# Patient Record
Sex: MALE | Race: WHITE | NOT HISPANIC OR LATINO | URBAN - METROPOLITAN AREA
[De-identification: names, ages, dates, MRNs, and addresses within clinical notes are randomized per-mention and may not be internally consistent; named-entity substitution may affect disease eponyms.]

---

## 2017-10-19 NOTE — PATIENT DISCUSSION
Patient is stable at this time.  He is cleared for any glaucoma procedure that Dr. Olivia Barclay recommends.

## 2021-07-02 ENCOUNTER — TELEPHONE ENCOUNTER (OUTPATIENT)
Dept: URBAN - METROPOLITAN AREA CLINIC 72 | Facility: CLINIC | Age: 66
End: 2021-07-02

## 2021-07-28 ENCOUNTER — WEB ENCOUNTER (OUTPATIENT)
Dept: URBAN - METROPOLITAN AREA CLINIC 72 | Facility: CLINIC | Age: 66
End: 2021-07-28

## 2021-07-28 ENCOUNTER — OFFICE VISIT (OUTPATIENT)
Dept: URBAN - METROPOLITAN AREA CLINIC 72 | Facility: CLINIC | Age: 66
End: 2021-07-28
Payer: MEDICARE

## 2021-07-28 VITALS
BODY MASS INDEX: 26.41 KG/M2 | SYSTOLIC BLOOD PRESSURE: 109 MMHG | HEIGHT: 72 IN | HEART RATE: 86 BPM | TEMPERATURE: 97.1 F | DIASTOLIC BLOOD PRESSURE: 77 MMHG | WEIGHT: 195 LBS

## 2021-07-28 DIAGNOSIS — R14.0 BLOATING: ICD-10-CM

## 2021-07-28 DIAGNOSIS — K59.09 OTHER CONSTIPATION: ICD-10-CM

## 2021-07-28 DIAGNOSIS — Z80.0 FAMILY HISTORY OF COLON CANCER: ICD-10-CM

## 2021-07-28 PROCEDURE — 99204 OFFICE O/P NEW MOD 45 MIN: CPT | Performed by: INTERNAL MEDICINE

## 2021-07-28 RX ORDER — FUROSEMIDE 40 MG/1
1 TABLET TABLET ORAL ONCE A DAY
Status: ACTIVE | COMMUNITY

## 2021-07-28 RX ORDER — CARVEDILOL 25 MG/1
1 TABLET WITH FOOD TABLET, FILM COATED ORAL TWICE A DAY
Status: ACTIVE | COMMUNITY

## 2021-07-28 RX ORDER — WARFARIN SODIUM 5 MG/1
1 TABLET TABLET ORAL ONCE A DAY
Status: ACTIVE | COMMUNITY

## 2021-07-28 RX ORDER — SIMVASTATIN 10 MG/1
1 TABLET IN THE EVENING TABLET, FILM COATED ORAL ONCE A DAY
Status: ACTIVE | COMMUNITY

## 2021-07-28 NOTE — HPI-TODAY'S VISIT:
Mr. Armenta is a pleasant 55-year-old male who presents with new patient for consultation for constipation, he was referred by Dr.Jimena Warren.  His past medical history of CHF/cardiomyopathy, atrial fibrillation, status post pacemaker, hypertension, colon polyps and a family history of colorectal cancer in mother.his last colonoscopy was approximately 4 years ago.  He reports that he has been extrinsic and constipation and bloating.  He had C. difficile a few years ago and since that time he is struggling on and off postprandial bloating.  In addition he reports that he has had issues with lower extremity edema and has been aggressively diuresed thus contributing to his constipation further.  He denies any blood in his stool, he endorses weight gain he relates to his volume overload/third spacing.  He is not on any fiber products.  He takes no stool softeners.  Laboratory from PCP 7/20/2021: WBC 6.9, hemoglobin 17.7, hematocrit 53.3, and CD99, platelets 171, normal CMP

## 2021-09-28 ENCOUNTER — OFFICE VISIT (OUTPATIENT)
Dept: URBAN - METROPOLITAN AREA CLINIC 72 | Facility: CLINIC | Age: 66
End: 2021-09-28
Payer: MEDICARE

## 2021-09-28 VITALS
HEART RATE: 89 BPM | TEMPERATURE: 97.7 F | DIASTOLIC BLOOD PRESSURE: 79 MMHG | BODY MASS INDEX: 25.87 KG/M2 | SYSTOLIC BLOOD PRESSURE: 112 MMHG | HEIGHT: 72 IN | WEIGHT: 191 LBS

## 2021-09-28 DIAGNOSIS — K59.09 OTHER CONSTIPATION: ICD-10-CM

## 2021-09-28 DIAGNOSIS — Z80.0 FAMILY HISTORY OF COLON CANCER: ICD-10-CM

## 2021-09-28 DIAGNOSIS — R14.0 BLOATING: ICD-10-CM

## 2021-09-28 PROCEDURE — 99214 OFFICE O/P EST MOD 30 MIN: CPT | Performed by: INTERNAL MEDICINE

## 2021-09-28 RX ORDER — SPIRONOLACTONE 25 MG/1
1 TABLET TABLET, FILM COATED ORAL
Status: ACTIVE | COMMUNITY

## 2021-09-28 RX ORDER — SIMVASTATIN 10 MG/1
1 TABLET IN THE EVENING TABLET, FILM COATED ORAL ONCE A DAY
Status: ACTIVE | COMMUNITY

## 2021-09-28 RX ORDER — CARVEDILOL 25 MG/1
1 TABLET WITH FOOD TABLET, FILM COATED ORAL TWICE A DAY
Status: ACTIVE | COMMUNITY

## 2021-09-28 RX ORDER — FUROSEMIDE 40 MG/1
1 TABLET TABLET ORAL ONCE A DAY
Status: ACTIVE | COMMUNITY

## 2021-09-28 RX ORDER — WARFARIN SODIUM 5 MG/1
1 TABLET TABLET ORAL ONCE A DAY
Status: ACTIVE | COMMUNITY

## 2021-09-28 NOTE — HPI-TODAY'S VISIT:
Mister Armenta is a pleasant 65-year-old male last seen office in 7/28/2021.  His history of CHF with cardiomegaly, atrial fibrillation, pacemaker placement, hypertension, personal history of colon polyps and family history of colorectal cancer in his mother.  His last colonoscopy was probably 4 years ago.  We have been seeing him for constipation and bloating.  Reported postprandial bloating in addition to volume overload.  Related further constipation to up titration of his diuretics.  Advised that he begin a fiber supplement.  We discussed consideration for repeat colonoscopy however given his volume overload we wanted to hold off for time being.   things initially improved on a high-fiber low-sodium diet however he notes that his fluid buildup again and he had increase his diuretics which then led to further constipation.  He notes the bloating has worsened as well, can only protein and squash without feeling bloated.

## 2021-10-13 ENCOUNTER — TELEPHONE ENCOUNTER (OUTPATIENT)
Dept: URBAN - METROPOLITAN AREA CLINIC 72 | Facility: CLINIC | Age: 66
End: 2021-10-13

## 2021-10-14 ENCOUNTER — LAB OUTSIDE AN ENCOUNTER (OUTPATIENT)
Dept: URBAN - METROPOLITAN AREA MEDICAL CENTER 40 | Facility: MEDICAL CENTER | Age: 66
End: 2021-10-14

## 2021-11-02 ENCOUNTER — OFFICE VISIT (OUTPATIENT)
Dept: URBAN - METROPOLITAN AREA CLINIC 72 | Facility: CLINIC | Age: 66
End: 2021-11-02

## 2021-11-12 ENCOUNTER — OFFICE VISIT (OUTPATIENT)
Dept: URBAN - METROPOLITAN AREA MEDICAL CENTER 40 | Facility: MEDICAL CENTER | Age: 66
End: 2021-11-12
Payer: MEDICARE

## 2021-11-12 DIAGNOSIS — D12.2 ADENOMA OF ASCENDING COLON: ICD-10-CM

## 2021-11-12 DIAGNOSIS — Z80.0 FAMILY HISTORY MALIGNANT NEOPLASM OF BILIARY TRACT: ICD-10-CM

## 2021-11-12 DIAGNOSIS — Z86.010 ADENOMAS PERSONAL HISTORY OF COLONIC POLYPS: ICD-10-CM

## 2021-11-12 DIAGNOSIS — K57.30 COLON, DIVERTICULOSIS: ICD-10-CM

## 2021-11-12 DIAGNOSIS — K63.5 BENIGN COLON POLYP: ICD-10-CM

## 2021-11-12 PROCEDURE — 45380 COLONOSCOPY AND BIOPSY: CPT | Performed by: INTERNAL MEDICINE

## 2021-11-12 PROCEDURE — 45385 COLONOSCOPY W/LESION REMOVAL: CPT | Performed by: INTERNAL MEDICINE

## 2022-03-14 ENCOUNTER — NEW PATIENT (OUTPATIENT)
Dept: URBAN - METROPOLITAN AREA CLINIC 20 | Facility: CLINIC | Age: 67
End: 2022-03-14

## 2022-03-14 DIAGNOSIS — H52.4: ICD-10-CM

## 2022-03-14 DIAGNOSIS — H25.813: ICD-10-CM

## 2022-03-14 PROCEDURE — 92004 COMPRE OPH EXAM NEW PT 1/>: CPT

## 2022-03-14 PROCEDURE — 92015 DETERMINE REFRACTIVE STATE: CPT

## 2022-03-14 ASSESSMENT — VISUAL ACUITY
OU_CC: J1+
OS_CC: 20/30-1
OD_CC: 20/20

## 2022-03-14 ASSESSMENT — KERATOMETRY
OD_K1POWER_DIOPTERS: 45.50
OD_K2POWER_DIOPTERS: 46.25
OS_K1POWER_DIOPTERS: 46.50
OS_AXISANGLE2_DEGREES: 90
OD_AXISANGLE2_DEGREES: 11
OS_AXISANGLE_DEGREES: 0
OD_AXISANGLE_DEGREES: 101
OS_K2POWER_DIOPTERS: 46.50

## 2022-03-14 ASSESSMENT — TONOMETRY
OD_IOP_MMHG: 13
OS_IOP_MMHG: 13

## 2023-04-04 ENCOUNTER — ESTABLISHED PATIENT (OUTPATIENT)
Dept: URBAN - METROPOLITAN AREA CLINIC 20 | Facility: CLINIC | Age: 68
End: 2023-04-04

## 2023-04-04 DIAGNOSIS — H52.4: ICD-10-CM

## 2023-04-04 DIAGNOSIS — H04.123: ICD-10-CM

## 2023-04-04 DIAGNOSIS — H25.813: ICD-10-CM

## 2023-04-04 PROCEDURE — 92015 DETERMINE REFRACTIVE STATE: CPT

## 2023-04-04 PROCEDURE — 92014 COMPRE OPH EXAM EST PT 1/>: CPT

## 2023-04-04 ASSESSMENT — TONOMETRY
OD_IOP_MMHG: 10
OS_IOP_MMHG: 10

## 2023-04-04 ASSESSMENT — VISUAL ACUITY
OD_CC: 20/30-2
OU_CC: 20/25-2
OU_CC: J3
OS_CC: 20/40-2

## 2023-04-04 ASSESSMENT — KERATOMETRY
OS_K1POWER_DIOPTERS: 46.50
OD_AXISANGLE2_DEGREES: 179
OS_K2POWER_DIOPTERS: 46.50
OD_AXISANGLE_DEGREES: 89
OS_AXISANGLE2_DEGREES: 90
OS_AXISANGLE_DEGREES: 0
OD_K1POWER_DIOPTERS: 45.50
OD_K2POWER_DIOPTERS: 46.50

## 2023-10-13 ENCOUNTER — OFFICE VISIT (OUTPATIENT)
Dept: URBAN - METROPOLITAN AREA CLINIC 72 | Facility: CLINIC | Age: 68
End: 2023-10-13
Payer: MEDICARE

## 2023-10-13 VITALS
TEMPERATURE: 97.3 F | BODY MASS INDEX: 27.82 KG/M2 | SYSTOLIC BLOOD PRESSURE: 94 MMHG | HEIGHT: 72 IN | HEART RATE: 91 BPM | DIASTOLIC BLOOD PRESSURE: 69 MMHG | WEIGHT: 205.4 LBS

## 2023-10-13 DIAGNOSIS — Z80.0 FAMILY HISTORY OF COLON CANCER: ICD-10-CM

## 2023-10-13 DIAGNOSIS — K59.09 OTHER CONSTIPATION: ICD-10-CM

## 2023-10-13 DIAGNOSIS — R14.0 BLOATING: ICD-10-CM

## 2023-10-13 DIAGNOSIS — Z86.010 HISTORY OF COLON POLYPS: ICD-10-CM

## 2023-10-13 PROCEDURE — 99214 OFFICE O/P EST MOD 30 MIN: CPT | Performed by: NURSE PRACTITIONER

## 2023-10-13 RX ORDER — WARFARIN SODIUM 5 MG/1
1 TABLET TABLET ORAL ONCE A DAY
Status: ACTIVE | COMMUNITY

## 2023-10-13 RX ORDER — SPIRONOLACTONE 25 MG/1
1 TABLET TABLET, FILM COATED ORAL
Status: ACTIVE | COMMUNITY

## 2023-10-13 RX ORDER — CARVEDILOL 25 MG/1
1 TABLET WITH FOOD TABLET, FILM COATED ORAL TWICE A DAY
Status: ACTIVE | COMMUNITY

## 2023-10-13 RX ORDER — FUROSEMIDE 40 MG/1
1 TABLET TABLET ORAL ONCE A DAY
Status: ACTIVE | COMMUNITY

## 2023-10-13 RX ORDER — SIMVASTATIN 10 MG/1
1 TABLET IN THE EVENING TABLET, FILM COATED ORAL ONCE A DAY
Status: ACTIVE | COMMUNITY

## 2023-10-13 NOTE — HPI-TODAY'S VISIT:
67-year-old male here for constipation and ER follow-up.    History of CHF with cardiomegaly, atrial fibrillation, pacemaker placement, hypertension, personal history of colon polyps and family history of colorectal cancer in his mother.  Seen in  the ER at [facility] 10/11/2023 with increased abdominal pain and constipation.  Was given an enema and had disimpaction per patient.  Discharged on GoLytely.  On 10/5/23 had ICD battery pack changed.  Constipated ever since.  He had to have disimpaction and then a 1.5 liter enema.  Had golytely yesterday. Now having the shakes and a small amout of soft stool and is wondering if he is dehydration. Takes 3 colace.  Eats 30 grams of fiber a day.  He was travelling earlier this week.  He was at 192 and up to 205 lbs.  Has some intermittent abdominal pain. No melena or hematochezia. Reports significant constipation and occurs every time he has a surgical procedure.

## 2023-10-13 NOTE — HPI-OTHER HISTORIES
Abdominal series x-ray 10/11/2023.  No acute abdominal or pulmonary pathology suggested.  Moderate stool.  No obstruction or free air.  Cardiomegaly with left-sided pacemaker.  Colonoscopy 11/12/2021.  4 colon polyps removed ranging in size from 3 to 5 mm.  Diverticulosis in the sigmoid mild in severity.  Repeat colonoscopy recommended 2026.  Polyps were a mixture of sessile serrated adenoma, polypoid and hyperplastic polyps.

## 2023-10-14 PROBLEM — 428283002: Status: ACTIVE | Noted: 2023-10-14

## 2023-11-10 ENCOUNTER — DASHBOARD ENCOUNTERS (OUTPATIENT)
Age: 68
End: 2023-11-10

## 2023-11-10 ENCOUNTER — OFFICE VISIT (OUTPATIENT)
Dept: URBAN - METROPOLITAN AREA CLINIC 72 | Facility: CLINIC | Age: 68
End: 2023-11-10
Payer: MEDICARE

## 2023-11-10 VITALS
WEIGHT: 198.2 LBS | SYSTOLIC BLOOD PRESSURE: 94 MMHG | DIASTOLIC BLOOD PRESSURE: 63 MMHG | BODY MASS INDEX: 26.84 KG/M2 | HEIGHT: 72 IN | TEMPERATURE: 97.3 F | HEART RATE: 82 BPM

## 2023-11-10 DIAGNOSIS — K59.09 OTHER CONSTIPATION: ICD-10-CM

## 2023-11-10 DIAGNOSIS — Z86.010 HISTORY OF COLON POLYPS: ICD-10-CM

## 2023-11-10 DIAGNOSIS — Z80.0 FAMILY HISTORY OF COLON CANCER: ICD-10-CM

## 2023-11-10 PROCEDURE — 99213 OFFICE O/P EST LOW 20 MIN: CPT | Performed by: NURSE PRACTITIONER

## 2023-11-10 RX ORDER — FUROSEMIDE 40 MG/1
1 TABLET TABLET ORAL ONCE A DAY
Status: ACTIVE | COMMUNITY

## 2023-11-10 RX ORDER — SPIRONOLACTONE 25 MG/1
1 TABLET TABLET, FILM COATED ORAL
Status: ACTIVE | COMMUNITY

## 2023-11-10 RX ORDER — SIMVASTATIN 10 MG/1
1 TABLET IN THE EVENING TABLET, FILM COATED ORAL ONCE A DAY
Status: ACTIVE | COMMUNITY

## 2023-11-10 RX ORDER — CARVEDILOL 25 MG/1
1 TABLET WITH FOOD TABLET, FILM COATED ORAL TWICE A DAY
Status: ACTIVE | COMMUNITY

## 2023-11-10 RX ORDER — WARFARIN SODIUM 5 MG/1
1 TABLET TABLET ORAL ONCE A DAY
Status: ACTIVE | COMMUNITY

## 2023-11-10 NOTE — HPI-TODAY'S VISIT:
67-year-old male here for a 1 month follow-up.  History of CHF with cardiomegaly, atrial fibrillation, pacemaker placement, hypertension, personal history of colon polyps and family history of colorectal cancer in his mother.  Last seen 10/13/2023 for constipation, bloating family history of colon cancer and history of polyps.  It was recommended increasing MiraLAX to twice daily or start milk of magnesia.  Hold Benefiber for the time being but resume if stools become loose.  Due for for surveillance colonoscopy 2026.  Seen in  the ER at [facility] 10/11/2023 with increased abdominal pain and constipation.  Was given an enema and had disimpaction per patient.  Discharged on GoLytely.  On 10/5/23 had ICD battery pack changed.  Constipated ever since.  He had to have disimpaction and then a 1.5 liter enema.  Had golytely after ER visit.    Today he is doing much better.  BM daily.  Taking Sasha nautrual supplement in the morning, miralax at night and MOM as needed.  Still on a soft diet.  No melena or hematochezia.  Weight is down 7 pounds from his last appointment-he states he feelt that is due to the lack of constipation and diuretic.

## 2024-03-16 NOTE — HPI-OTHER HISTORIES
Abdominal series x-ray 10/11/2023.  No acute abdominal or pulmonary pathology suggested.  Moderate stool.  No obstruction or free air.  Cardiomegaly with left-sided pacemaker.  Colonoscopy 11/12/2021.  4 colon polyps removed ranging in size from 3 to 5 mm.  Diverticulosis in the sigmoid mild in severity.  Repeat colonoscopy recommended 2026.  Polyps were a mixture of sessile serrated adenoma, polypoid and hyperplastic polyps. Rx for keppra (liquid solution) 5.85 ml (500mg/ml) (585mg) oral bid called into [facility] pharmacy in [place], [place].